# Patient Record
Sex: FEMALE | Race: WHITE | NOT HISPANIC OR LATINO | Employment: UNEMPLOYED | URBAN - METROPOLITAN AREA
[De-identification: names, ages, dates, MRNs, and addresses within clinical notes are randomized per-mention and may not be internally consistent; named-entity substitution may affect disease eponyms.]

---

## 2017-02-24 ENCOUNTER — HOSPITAL ENCOUNTER (EMERGENCY)
Facility: MEDICAL CENTER | Age: 27
End: 2017-02-24
Attending: EMERGENCY MEDICINE
Payer: MEDICAID

## 2017-02-24 VITALS
HEART RATE: 90 BPM | TEMPERATURE: 97.6 F | SYSTOLIC BLOOD PRESSURE: 118 MMHG | HEIGHT: 65 IN | WEIGHT: 89.51 LBS | DIASTOLIC BLOOD PRESSURE: 74 MMHG | RESPIRATION RATE: 18 BRPM | OXYGEN SATURATION: 99 % | BODY MASS INDEX: 14.91 KG/M2

## 2017-02-24 DIAGNOSIS — J06.9 VIRAL UPPER RESPIRATORY ILLNESS: ICD-10-CM

## 2017-02-24 PROCEDURE — 99283 EMERGENCY DEPT VISIT LOW MDM: CPT

## 2017-02-24 ASSESSMENT — PAIN SCALES - GENERAL: PAINLEVEL_OUTOF10: 7

## 2017-02-24 NOTE — ED NOTES
Discharged in good condition with follow up instructions, pt verbalizes understanding of all, ambulates out with steady gait accompanied by family.

## 2017-02-24 NOTE — ED AVS SNAPSHOT
Home Care Instructions                                                                                                                Radha Cerrato   MRN: [id number]    Department:  [facility], Emergency Dept   Date of Visit:  2/24/2017            [facility], Emergency Dept    [address]    [place] [address]    Phone:  [phone]      You were seen by     Jl Day M.D.      Your Diagnosis Was     Viral upper respiratory illness     J06.9, B97.89       Medication Information     Review all of your home medications and newly ordered medications with your primary doctor and/or pharmacist as soon as possible. Follow medication instructions as directed by your doctor and/or pharmacist.     Please keep your complete medication list with you and share with your physician. Update the information when medications are discontinued, doses are changed, or new medications (including over-the-counter products) are added; and carry medication information at all times in the event of emergency situations.               Medication List      ASK your doctor about these medications        Instructions    azithromycin 250 MG Tabs   Commonly known as:  ZITHROMAX    Please dispense a z-hannah and use a directed       hydrocodone-acetaminophen 5-325 MG Tabs per tablet   Commonly known as:  NORCO    Take 1-2 Tabs by mouth every four hours as needed.   Dose:  1-2 Tab       naproxen 500 MG Tabs   Commonly known as:  NAPROSYN    Take 1 Tab by mouth 2 times a day, with meals.   Dose:  500 mg       ondansetron 4 MG Tbdp   Commonly known as:  ZOFRAN ODT    Take 1 Tab by mouth every 8 hours as needed for Nausea/Vomiting.   Dose:  4 mg                 Discharge Instructions       Antibiotic Nonuse   Your caregiver felt that the infection or problem was not one that would be helped with an antibiotic.  Infections may be caused by viruses or bacteria. Only a caregiver can tell  "which one of these is the likely cause of an illness. A cold is the most common cause of infection in both adults and children. A cold is a virus. Antibiotic treatment will have no effect on a viral infection. Viruses can lead to many lost days of work caring for sick children and many missed days of school. Children may catch as many as 10 \"colds\" or \"flus\" per year during which they can be tearful, cranky, and uncomfortable. The goal of treating a virus is aimed at keeping the ill person comfortable.  Antibiotics are medications used to help the body fight bacterial infections. There are relatively few types of bacteria that cause infections but there are hundreds of viruses. While both viruses and bacteria cause infection they are very different types of germs. A viral infection will typically go away by itself within 7 to 10 days. Bacterial infections may spread or get worse without antibiotic treatment.  Examples of bacterial infections are:  · Sore throats (like strep throat or tonsillitis).  · Infection in the lung (pneumonia).  · Ear and skin infections.  Examples of viral infections are:  · Colds or flus.  · Most coughs and bronchitis.  · Sore throats not caused by Strep.  · Runny noses.  It is often best not to take an antibiotic when a viral infection is the cause of the problem. Antibiotics can kill off the helpful bacteria that we have inside our body and allow harmful bacteria to start growing. Antibiotics can cause side effects such as allergies, nausea, and diarrhea without helping to improve the symptoms of the viral infection. Additionally, repeated uses of antibiotics can cause bacteria inside of our body to become resistant. That resistance can be passed onto harmful bacterial. The next time you have an infection it may be harder to treat if antibiotics are used when they are not needed. Not treating with antibiotics allows our own immune system to develop and take care of infections more " efficiently. Also, antibiotics will work better for us when they are prescribed for bacterial infections.  Treatments for a child that is ill may include:  · Give extra fluids throughout the day to stay hydrated.  · Get plenty of rest.  · Only give your child over-the-counter or prescription medicines for pain, discomfort, or fever as directed by your caregiver.  · The use of a cool mist humidifier may help stuffy noses.  · Cold medications if suggested by your caregiver.  Your caregiver may decide to start you on an antibiotic if:  · The problem you were seen for today continues for a longer length of time than expected.  · You develop a secondary bacterial infection.  SEEK MEDICAL CARE IF:  · Fever lasts longer than 5 days.  · Symptoms continue to get worse after 5 to 7 days or become severe.  · Difficulty in breathing develops.  · Signs of dehydration develop (poor drinking, rare urinating, dark colored urine).  · Changes in behavior or worsening tiredness (listlessness or lethargy).  Document Released: 02/26/2003 Document Revised: 03/11/2013 Document Reviewed: 08/25/2010  ExitCare® Patient Information ©2014 [employer].    Cool Mist Vaporizers  Vaporizers may help relieve the symptoms of a cough and cold. They add moisture to the air, which helps mucus to become thinner and less sticky. This makes it easier to breathe and cough up secretions. Cool mist vaporizers do not cause serious burns like hot mist vaporizers, which may also be called steamers or humidifiers. Vaporizers have not been proven to help with colds. You should not use a vaporizer if you are allergic to mold.  HOME CARE INSTRUCTIONS  · Follow the package instructions for the vaporizer.  · Do not use anything other than distilled water in the vaporizer.  · Do not run the vaporizer all of the time. This can cause mold or bacteria to grow in the vaporizer.  · Clean the vaporizer after each time it is used.  · Clean and dry the vaporizer well before  "storing it.  · Stop using the vaporizer if worsening respiratory symptoms develop.     This information is not intended to replace advice given to you by your health care provider. Make sure you discuss any questions you have with your health care provider.     Document Released: 09/14/2005 Document Revised: 12/23/2014 Document Reviewed: 05/07/2014  [employer] Interactive Patient Education ©2016 [employer]    Viral Infections  A viral infection can be caused by different types of viruses. Most viral infections are not serious and resolve on their own. However, some infections may cause severe symptoms and may lead to further complications.  SYMPTOMS  Viruses can frequently cause:  · Minor sore throat.  · Aches and pains.  · Headaches.  · Runny nose.  · Different types of rashes.  · Watery eyes.  · Tiredness.  · Cough.  · Loss of appetite.  · Gastrointestinal infections, resulting in nausea, vomiting, and diarrhea.  These symptoms do not respond to antibiotics because the infection is not caused by bacteria. However, you might catch a bacterial infection following the viral infection. This is sometimes called a \"superinfection.\" Symptoms of such a bacterial infection may include:  · Worsening sore throat with pus and difficulty swallowing.  · Swollen neck glands.  · Chills and a high or persistent fever.  · Severe headache.  · Tenderness over the sinuses.  · Persistent overall ill feeling (malaise), muscle aches, and tiredness (fatigue).  · Persistent cough.  · Yellow, green, or brown mucus production with coughing.  HOME CARE INSTRUCTIONS   · Only take over-the-counter or prescription medicines for pain, discomfort, diarrhea, or fever as directed by your caregiver.  · Drink enough water and fluids to keep your urine clear or pale yellow. Sports drinks can provide valuable electrolytes, sugars, and hydration.  · Get plenty of rest and maintain proper nutrition. Soups and broths with crackers or rice are fine.  SEEK " IMMEDIATE MEDICAL CARE IF:   · You have severe headaches, shortness of breath, chest pain, neck pain, or an unusual rash.  · You have uncontrolled vomiting, diarrhea, or you are unable to keep down fluids.  · You or your child has an oral temperature above 102° F (38.9° C), not controlled by medicine.  · Your baby is older than 3 months with a rectal temperature of 102° F (38.9° C) or higher.  · Your baby is 3 months old or younger with a rectal temperature of 100.4° F (38° C) or higher.  MAKE SURE YOU:   · Understand these instructions.  · Will watch your condition.  · Will get help right away if you are not doing well or get worse.     This information is not intended to replace advice given to you by your health care provider. Make sure you discuss any questions you have with your health care provider.     Document Released: 09/27/2006 Document Revised: 03/11/2013 Document Reviewed: 04/23/2012  [employer] Interactive Patient Education ©2016 [employer].            Patient Information     Patient Information    Following emergency treatment: all patient requiring follow-up care must return either to a private physician or a clinic if your condition worsens before you are able to obtain further medical attention, please return to the emergency room.     Billing Information    At [facility], we work to make the billing process streamlined for our patients.  Our Representatives are here to answer any questions you may have regarding your hospital bill.  If you have insurance coverage and have supplied your insurance information to us, we will submit a claim to your insurer on your behalf.  Should you have any questions regarding your bill, we can be reached online or by phone as follows:  Online: You are able pay your bills online or live chat with our representatives about any billing questions you may have. We are here to help Monday - Friday from 8:00am to 7:30pm and 9:00am - 12:00pm on Saturdays.  Please visit  [email or web address]  for more information.   Phone:  [phone] or [phone]    Please note that your emergency physician, surgeon, pathologist, radiologist, anesthesiologist, and other specialists are not employed by [facility] and will therefore bill separately for their services.  Please contact them directly for any questions concerning their bills at the numbers below:     Emergency Physician Services:  [phone]  [facility]:  [phone]  [facility]:  [phone]  [facility]:  [phone]    1. Your final bill may vary from the amount quoted upon discharge if all procedures are not complete at that time, or if your doctor has additional procedures of which we are not aware. You will receive an additional bill if you return to the Emergency Department at [facility] for suture removal regardless of the facility of which the sutures were placed.     2. Please arrange for settlement of this account at the emergency registration.    3. All self-pay accounts are due in full at the time of treatment.  If you are unable to meet this obligation then payment is expected within 4-5 days.     4. If you have had radiology studies (CT, X-ray, Ultrasound, MRI), you have received a preliminary result during your emergency department visit. Please contact the radiology department [phone] to receive a copy of your final result. Please discuss the Final result with your primary physician or with the follow up physician provided.     Crisis Hotline:  [place] Crisis Hotline:  7-975-BJBLUYA or 1-735.918.2287  [place] Crisis Hotline:    1-647.437.4531 or 597-312-9407         ED Discharge Follow Up Questions    1. In order to provide you with very good care, we would like to follow up with a phone call in the next few days.  May we have your permission to contact you?     YES /  NO    2. What is the best phone number to call  you? (       )_____-__________    3. What is the best time to call you?      Morning  /  Afternoon  /  Evening                   Patient Signature:  ____________________________________________________________    Date:  ____________________________________________________________

## 2017-02-24 NOTE — ED AVS SNAPSHOT
Kuldat Access Code: [email or web address]  Expires: 3/26/2017  2:42 PM    Kuldat  A secure, online tool to manage your health information     [employer]’s Kuldat® is a secure, online tool that connects you to your personalized health information from the privacy of your home -- day or night - making it very easy for you to manage your healthcare. Once the activation process is completed, you can even access your medical information using the Kuldat iwona, which is available for free in the Apple Iwona store or Google Play store.     Kuldat provides the following levels of access (as shown below):   My Chart Features   [facility] Primary Care Doctor [facility]  Specialists [facility]  Urgent  Care Non-[facility]  Primary Care  Doctor   Email your healthcare team securely and privately 24/7 X X X X   Manage appointments: schedule your next appointment; view details of past/upcoming appointments X      Request prescription refills. X      View recent personal medical records, including lab and immunizations X X X X   View health record, including health history, allergies, medications X X X X   Read reports about your outpatient visits, procedures, consult and ER notes X X X X   See your discharge summary, which is a recap of your hospital and/or ER visit that includes your diagnosis, lab results, and care plan. X X       How to register for Kuldat:  1. Go to  [email or web address].  2. Click on the Sign Up Now box, which takes you to the New Member Sign Up page. You will need to provide the following information:  a. Enter your Kuldat Access Code exactly as it appears at the top of this page. (You will not need to use this code after you’ve completed the sign-up process. If you do not sign up before the expiration date, you must request a new code.)   b. Enter your date of birth.   c. Enter your home email address.   d. Click Submit, and follow the next screen’s instructions.  3. Create a Kuldat ID. This will be your Kuldat  login ID and cannot be changed, so think of one that is secure and easy to remember.  4. Create a [employer] password. You can change your password at any time.  5. Enter your Password Reset Question and Answer. This can be used at a later time if you forget your password.   6. Enter your e-mail address. This allows you to receive e-mail notifications when new information is available in [employer] BATTERY.  7. Click Sign Up. You can now view your health information.    For assistance activating your [employer] BATTERY account, call [phone]

## 2017-02-24 NOTE — ED PROVIDER NOTES
"ED Provider Note    CHIEF COMPLAINT  Chief Complaint   Patient presents with   • Sore Throat       HPI  Radha Cerrato is a 26 y.o. female who presents with a report of sore throat, cough, congestion and nasal discharge since this past Monday. She says that she got this infection from her . He was only sick 3 days. She says she also has bilateral ear pain. Denies any significant fever or other complaints    REVIEW OF SYSTEMS  See HPI for further details. All other systems are negative.     PAST MEDICAL HISTORY  Past Medical History   Diagnosis Date   • Ectopic pregnancy        FAMILY HISTORY  History reviewed. No pertinent family history.    SOCIAL HISTORY   reports that she has been smoking Cigarettes.  She has been smoking about 0.50 packs per day. She does not have any smokeless tobacco history on file. She reports that she does not drink alcohol or use illicit drugs.    SURGICAL HISTORY  History reviewed. No pertinent past surgical history.    CURRENT MEDICATIONS  Home Medications     **Home medications have not yet been reviewed for this encounter**          ALLERGIES  No Known Allergies    PHYSICAL EXAM  VITAL SIGNS: /74 mmHg  Pulse 90  Temp(Src) 36.4 °C (97.6 °F)  Resp 18  Ht 1.651 m (5' 5\")  Wt 40.6 kg (89 lb 8.1 oz)  BMI 14.89 kg/m2  SpO2 99%  LMP 01/26/2017   Constitutional: Well developed, Well nourished, No acute distress, Non-toxic appearance.   HENT: Normocephalic, Atraumatic, Bilateral external ears normal, bilateral tympanic membranes are only mildly erythematous , Oropharynx is mildly erythematous with small punctate exudate left side, no evidence of tonsillar swelling with tonsils are grade 1  Eyes: Pupils are equal round and reactive, EOMI, Conjunctiva normal, No discharge.   Neck: Normal range of motion, No tenderness, Supple, No stridor. No meningismus.  Lymphatic: No lymphadenopathy noted.   Cardiovascular: Regular rate and rhythm without murmur rub or " gallop.  Thorax & Lungs: Clear breath sounds bilaterally without wheezes, rhonchi or rales. There is no chest wall tenderness.   Abdomen: Soft non-tender non-distended. There is no rebound or guarding. No organomegaly is appreciated. Bowel sounds are normal.  Skin: Normal without rash.   Back: No CVA or spinal tenderness.   Extremities: Intact distal pulses, No edema, No tenderness, No cyanosis, No clubbing. Capillary refill is less than 2 seconds.  Musculoskeletal: Good range of motion in all major joints. No tenderness to palpation or major deformities noted.   Neurologic: Alert & oriented x 3, Normal motor function, Normal sensory function, No focal deficits noted. Reflexes are normal.  Psychiatric: Affect normal, Judgment normal, Mood normal. There is no suicidal ideation or patient reported hallucinations.       COURSE & MEDICAL DECISION MAKING  Pertinent Labs & Imaging studies reviewed. (See chart for details)  Given the patient's symptomatology, the likelihood of a viral illness is high. I very much doubt strep throat. The patient understand that the immune system is built to clear this type of infection. Patient understand that antibiotics will not change the course of this type of infection and that the patient's immune system is well suited to find this type of infection. The mainstay of therapy for viral infections is copious fluids, rest, fever control and frequent hand washing to avoid spread of the illness. Cool mist humidifier in the patient's bedroom will keep his mucous membranes healthy.    She is given instructions on symptomatic control. She will return if any significant change in symptoms    FINAL IMPRESSION  1. Viral upper respiratory illness             Electronically signed by: Jl Day, 2/24/2017 2:40 PM

## 2017-02-24 NOTE — DISCHARGE INSTRUCTIONS
"Antibiotic Nonuse   Your caregiver felt that the infection or problem was not one that would be helped with an antibiotic.  Infections may be caused by viruses or bacteria. Only a caregiver can tell which one of these is the likely cause of an illness. A cold is the most common cause of infection in both adults and children. A cold is a virus. Antibiotic treatment will have no effect on a viral infection. Viruses can lead to many lost days of work caring for sick children and many missed days of school. Children may catch as many as 10 \"colds\" or \"flus\" per year during which they can be tearful, cranky, and uncomfortable. The goal of treating a virus is aimed at keeping the ill person comfortable.  Antibiotics are medications used to help the body fight bacterial infections. There are relatively few types of bacteria that cause infections but there are hundreds of viruses. While both viruses and bacteria cause infection they are very different types of germs. A viral infection will typically go away by itself within 7 to 10 days. Bacterial infections may spread or get worse without antibiotic treatment.  Examples of bacterial infections are:  · Sore throats (like strep throat or tonsillitis).  · Infection in the lung (pneumonia).  · Ear and skin infections.  Examples of viral infections are:  · Colds or flus.  · Most coughs and bronchitis.  · Sore throats not caused by Strep.  · Runny noses.  It is often best not to take an antibiotic when a viral infection is the cause of the problem. Antibiotics can kill off the helpful bacteria that we have inside our body and allow harmful bacteria to start growing. Antibiotics can cause side effects such as allergies, nausea, and diarrhea without helping to improve the symptoms of the viral infection. Additionally, repeated uses of antibiotics can cause bacteria inside of our body to become resistant. That resistance can be passed onto harmful bacterial. The next time you have " an infection it may be harder to treat if antibiotics are used when they are not needed. Not treating with antibiotics allows our own immune system to develop and take care of infections more efficiently. Also, antibiotics will work better for us when they are prescribed for bacterial infections.  Treatments for a child that is ill may include:  · Give extra fluids throughout the day to stay hydrated.  · Get plenty of rest.  · Only give your child over-the-counter or prescription medicines for pain, discomfort, or fever as directed by your caregiver.  · The use of a cool mist humidifier may help stuffy noses.  · Cold medications if suggested by your caregiver.  Your caregiver may decide to start you on an antibiotic if:  · The problem you were seen for today continues for a longer length of time than expected.  · You develop a secondary bacterial infection.  SEEK MEDICAL CARE IF:  · Fever lasts longer than 5 days.  · Symptoms continue to get worse after 5 to 7 days or become severe.  · Difficulty in breathing develops.  · Signs of dehydration develop (poor drinking, rare urinating, dark colored urine).  · Changes in behavior or worsening tiredness (listlessness or lethargy).  Document Released: 02/26/2003 Document Revised: 03/11/2013 Document Reviewed: 08/25/2010  [employer]Care® Patient Information ©2014 [employer].    Cool Mist Vaporizers  Vaporizers may help relieve the symptoms of a cough and cold. They add moisture to the air, which helps mucus to become thinner and less sticky. This makes it easier to breathe and cough up secretions. Cool mist vaporizers do not cause serious burns like hot mist vaporizers, which may also be called steamers or humidifiers. Vaporizers have not been proven to help with colds. You should not use a vaporizer if you are allergic to mold.  HOME CARE INSTRUCTIONS  · Follow the package instructions for the vaporizer.  · Do not use anything other than distilled water in the  "vaporizer.  · Do not run the vaporizer all of the time. This can cause mold or bacteria to grow in the vaporizer.  · Clean the vaporizer after each time it is used.  · Clean and dry the vaporizer well before storing it.  · Stop using the vaporizer if worsening respiratory symptoms develop.     This information is not intended to replace advice given to you by your health care provider. Make sure you discuss any questions you have with your health care provider.     Document Released: 09/14/2005 Document Revised: 12/23/2014 Document Reviewed: 05/07/2014  [employer] Interactive Patient Education ©2016 [employer]    Viral Infections  A viral infection can be caused by different types of viruses. Most viral infections are not serious and resolve on their own. However, some infections may cause severe symptoms and may lead to further complications.  SYMPTOMS  Viruses can frequently cause:  · Minor sore throat.  · Aches and pains.  · Headaches.  · Runny nose.  · Different types of rashes.  · Watery eyes.  · Tiredness.  · Cough.  · Loss of appetite.  · Gastrointestinal infections, resulting in nausea, vomiting, and diarrhea.  These symptoms do not respond to antibiotics because the infection is not caused by bacteria. However, you might catch a bacterial infection following the viral infection. This is sometimes called a \"superinfection.\" Symptoms of such a bacterial infection may include:  · Worsening sore throat with pus and difficulty swallowing.  · Swollen neck glands.  · Chills and a high or persistent fever.  · Severe headache.  · Tenderness over the sinuses.  · Persistent overall ill feeling (malaise), muscle aches, and tiredness (fatigue).  · Persistent cough.  · Yellow, green, or brown mucus production with coughing.  HOME CARE INSTRUCTIONS   · Only take over-the-counter or prescription medicines for pain, discomfort, diarrhea, or fever as directed by your caregiver.  · Drink enough water and fluids to keep your " urine clear or pale yellow. Sports drinks can provide valuable electrolytes, sugars, and hydration.  · Get plenty of rest and maintain proper nutrition. Soups and broths with crackers or rice are fine.  SEEK IMMEDIATE MEDICAL CARE IF:   · You have severe headaches, shortness of breath, chest pain, neck pain, or an unusual rash.  · You have uncontrolled vomiting, diarrhea, or you are unable to keep down fluids.  · You or your child has an oral temperature above 102° F (38.9° C), not controlled by medicine.  · Your baby is older than 3 months with a rectal temperature of 102° F (38.9° C) or higher.  · Your baby is 3 months old or younger with a rectal temperature of 100.4° F (38° C) or higher.  MAKE SURE YOU:   · Understand these instructions.  · Will watch your condition.  · Will get help right away if you are not doing well or get worse.     This information is not intended to replace advice given to you by your health care provider. Make sure you discuss any questions you have with your health care provider.     Document Released: 09/27/2006 Document Revised: 03/11/2013 Document Reviewed: 04/23/2012  [employer] Interactive Patient Education ©2016 [employer]

## 2017-02-24 NOTE — ED AVS SNAPSHOT
2/24/2017          Radha Cerrato  [address]  [address]  [place] [address]    Dear Radha:    [facility] wants to ensure your discharge home is safe and you or your loved ones have had all your questions answered regarding your care after you leave the hospital.    You may receive a telephone call within two days of your discharge.  This call is to make certain you understand your discharge instructions as well as ensure we provided you with the best care possible during your stay with us.     The call will only last approximately 3-5 minutes and will be done by a nurse.    Once again, we want to ensure your discharge home is safe and that you have a clear understanding of any next steps in your care.  If you have any questions or concerns, please do not hesitate to contact us, we are here for you.  Thank you for choosing [facility] for your healthcare needs.    Sincerely,    Fernandez Do    [facility]

## 2018-11-27 ENCOUNTER — HOSPITAL ENCOUNTER (EMERGENCY)
Facility: MEDICAL CENTER | Age: 28
End: 2018-11-27
Attending: EMERGENCY MEDICINE
Payer: MEDICAID

## 2018-11-27 VITALS
OXYGEN SATURATION: 97 % | HEART RATE: 63 BPM | BODY MASS INDEX: 15.83 KG/M2 | SYSTOLIC BLOOD PRESSURE: 110 MMHG | TEMPERATURE: 97.7 F | RESPIRATION RATE: 17 BRPM | DIASTOLIC BLOOD PRESSURE: 84 MMHG | WEIGHT: 95 LBS | HEIGHT: 65 IN

## 2018-11-27 DIAGNOSIS — E86.0 DEHYDRATION: ICD-10-CM

## 2018-11-27 DIAGNOSIS — R11.2 NON-INTRACTABLE VOMITING WITH NAUSEA, UNSPECIFIED VOMITING TYPE: ICD-10-CM

## 2018-11-27 LAB
ALBUMIN SERPL BCP-MCNC: 3.6 G/DL (ref 3.2–4.9)
ALBUMIN/GLOB SERPL: 1.4 G/DL
ALP SERPL-CCNC: 40 U/L (ref 30–99)
ALT SERPL-CCNC: 17 U/L (ref 2–50)
ANION GAP SERPL CALC-SCNC: 11 MMOL/L (ref 0–11.9)
APPEARANCE UR: ABNORMAL
AST SERPL-CCNC: 14 U/L (ref 12–45)
BACTERIA #/AREA URNS HPF: ABNORMAL /HPF
BASOPHILS # BLD AUTO: 0.3 % (ref 0–1.8)
BASOPHILS # BLD: 0.05 K/UL (ref 0–0.12)
BILIRUB SERPL-MCNC: 0.5 MG/DL (ref 0.1–1.5)
BILIRUB UR QL STRIP.AUTO: NEGATIVE
BUN SERPL-MCNC: 12 MG/DL (ref 8–22)
CALCIUM SERPL-MCNC: 8.5 MG/DL (ref 8.5–10.5)
CHLORIDE SERPL-SCNC: 108 MMOL/L (ref 96–112)
CO2 SERPL-SCNC: 19 MMOL/L (ref 20–33)
COLOR UR: YELLOW
CREAT SERPL-MCNC: 0.67 MG/DL (ref 0.5–1.4)
EOSINOPHIL # BLD AUTO: 0.01 K/UL (ref 0–0.51)
EOSINOPHIL NFR BLD: 0.1 % (ref 0–6.9)
EPI CELLS #/AREA URNS HPF: ABNORMAL /HPF
ERYTHROCYTE [DISTWIDTH] IN BLOOD BY AUTOMATED COUNT: 43.9 FL (ref 35.9–50)
GLOBULIN SER CALC-MCNC: 2.6 G/DL (ref 1.9–3.5)
GLUCOSE SERPL-MCNC: 91 MG/DL (ref 65–99)
GLUCOSE UR STRIP.AUTO-MCNC: NEGATIVE MG/DL
HCT VFR BLD AUTO: 36.2 % (ref 37–47)
HGB BLD-MCNC: 12.5 G/DL (ref 12–16)
HYALINE CASTS #/AREA URNS LPF: ABNORMAL /LPF
IMM GRANULOCYTES # BLD AUTO: 0.08 K/UL (ref 0–0.11)
IMM GRANULOCYTES NFR BLD AUTO: 0.5 % (ref 0–0.9)
KETONES UR STRIP.AUTO-MCNC: ABNORMAL MG/DL
LEUKOCYTE ESTERASE UR QL STRIP.AUTO: ABNORMAL
LYMPHOCYTES # BLD AUTO: 2.99 K/UL (ref 1–4.8)
LYMPHOCYTES NFR BLD: 18.3 % (ref 22–41)
MCH RBC QN AUTO: 31.8 PG (ref 27–33)
MCHC RBC AUTO-ENTMCNC: 34.5 G/DL (ref 33.6–35)
MCV RBC AUTO: 92.1 FL (ref 81.4–97.8)
MICRO URNS: ABNORMAL
MONOCYTES # BLD AUTO: 1.26 K/UL (ref 0–0.85)
MONOCYTES NFR BLD AUTO: 7.7 % (ref 0–13.4)
NEUTROPHILS # BLD AUTO: 11.95 K/UL (ref 2–7.15)
NEUTROPHILS NFR BLD: 73.1 % (ref 44–72)
NITRITE UR QL STRIP.AUTO: NEGATIVE
NRBC # BLD AUTO: 0 K/UL
NRBC BLD-RTO: 0 /100 WBC
PH UR STRIP.AUTO: >=9 [PH]
PLATELET # BLD AUTO: 485 K/UL (ref 164–446)
PMV BLD AUTO: 9 FL (ref 9–12.9)
POTASSIUM SERPL-SCNC: 3.4 MMOL/L (ref 3.6–5.5)
PROT SERPL-MCNC: 6.2 G/DL (ref 6–8.2)
PROT UR QL STRIP: 30 MG/DL
RBC # BLD AUTO: 3.93 M/UL (ref 4.2–5.4)
RBC # URNS HPF: ABNORMAL /HPF
RBC UR QL AUTO: NEGATIVE
SODIUM SERPL-SCNC: 138 MMOL/L (ref 135–145)
SP GR UR STRIP.AUTO: 1.02
UROBILINOGEN UR STRIP.AUTO-MCNC: 0.2 MG/DL
WBC # BLD AUTO: 16.3 K/UL (ref 4.8–10.8)
WBC #/AREA URNS HPF: ABNORMAL /HPF

## 2018-11-27 PROCEDURE — 81001 URINALYSIS AUTO W/SCOPE: CPT

## 2018-11-27 PROCEDURE — A9270 NON-COVERED ITEM OR SERVICE: HCPCS | Performed by: EMERGENCY MEDICINE

## 2018-11-27 PROCEDURE — 99284 EMERGENCY DEPT VISIT MOD MDM: CPT

## 2018-11-27 PROCEDURE — 80053 COMPREHEN METABOLIC PANEL: CPT

## 2018-11-27 PROCEDURE — 700102 HCHG RX REV CODE 250 W/ 637 OVERRIDE(OP): Performed by: EMERGENCY MEDICINE

## 2018-11-27 PROCEDURE — 85025 COMPLETE CBC W/AUTO DIFF WBC: CPT

## 2018-11-27 RX ORDER — ACETAMINOPHEN 325 MG/1
650 TABLET ORAL ONCE
Status: COMPLETED | OUTPATIENT
Start: 2018-11-27 | End: 2018-11-27

## 2018-11-27 RX ORDER — IBUPROFEN 600 MG/1
600 TABLET ORAL ONCE
Status: COMPLETED | OUTPATIENT
Start: 2018-11-27 | End: 2018-11-27

## 2018-11-27 RX ORDER — ONDANSETRON 4 MG/1
4 TABLET, FILM COATED ORAL EVERY 4 HOURS PRN
Qty: 20 TAB | Refills: 0 | Status: SHIPPED | OUTPATIENT
Start: 2018-11-27 | End: 2019-09-20

## 2018-11-27 RX ADMIN — ACETAMINOPHEN 650 MG: 325 TABLET, FILM COATED ORAL at 21:21

## 2018-11-27 RX ADMIN — IBUPROFEN 600 MG: 600 TABLET, FILM COATED ORAL at 21:21

## 2018-11-28 NOTE — ED NOTES
Pt discharged home. Assessment complete. Pt ambulates self. VS stable. Pt verbalized understanding discharge instructions. Prescriptions given pt verbalizes teaching provided. Pt being driven home by family.

## 2018-11-28 NOTE — ED TRIAGE NOTES
Radha Cerrato  28 y.o. female  Chief Complaint   Patient presents with   • N/V     Pt reports she went out drinking yesterday evening, got very intoxicated and has been actively vomiting all day. She reports nausea increases with movement. 1L NS, 8mg zofran, 100mg thiamine PTA with minimal relief.    • Headache      Pt reports she also fell out of her bed last night and hit her head, she states she's had a headache since. -Bloodthinners. No obvious injury. GCS 15.      Pt is alert and oriented, speaking in full sentences, follows commands and responds appropriately to questions. NAD. Resp are even and unlabored.

## 2018-11-28 NOTE — ED PROVIDER NOTES
ED Provider Note    Scribed for Lynne Lowe M.D. by Federico Pierre. 11/27/2018, 8:59 PM.    Primary care provider: Pcp Pt States None  Means of arrival: Ambulance  History obtained from: Patient  History limited by: None    CHIEF COMPLAINT  Chief Complaint   Patient presents with   • N/V     Pt reports she went out drinking yesterday evening, got very intoxicated and has been actively vomiting all day. She reports nausea increases with movement. 1L NS, 8mg zofran, 100mg thiamine PTA with minimal relief.    • Headache      Pt reports she also fell out of her bed last night and hit her head, she states she's had a headache since. -Bloodthinners. No obvious injury. GCS 15.        HPI  Radha Cerrato is a 28 y.o. female who presents to the Emergency Department for evaluation of headache onset last night. The patient became very intoxicated last night and does not remember walking home. She believes her fiance helped her home. The patient confirms multiple falls onto concrete floors last night. She believes that she may have hit her head. She also reports right hip pain after falling on it last night. The patient reports that she has been nauseated since last night and began having multiple episodes of emesis onset 20 hours ago. She denies any fevers, shortness of breath, and vision changes. She denies any blood thinners.    REVIEW OF SYSTEMS  Pertinent positives include headache, nausea, emesis, and hip pain. Pertinent negatives include no fevers, shortness of breath and vision changes.  All other systems reviewed and negative.    PAST MEDICAL HISTORY   has a past medical history of Ectopic pregnancy.    SURGICAL HISTORY  patient denies any surgical history    SOCIAL HISTORY  Social History   Substance Use Topics   • Smoking status: Current Every Day Smoker     Packs/day: 0.50     Types: Cigarettes   • Smokeless tobacco: Never Used   • Alcohol use No      Comment: social      History   Drug Use      "Comment: marijuana       FAMILY HISTORY  None noted    CURRENT MEDICATIONS  No current facility-administered medications for this encounter.     Current Outpatient Prescriptions:   •  ondansetron (ZOFRAN) 4 MG Tab tablet, Take 1 Tab by mouth every four hours as needed for Nausea/Vomiting., Disp: 20 Tab, Rfl: 0  •  ondansetron (ZOFRAN ODT) 4 MG TABLET DISPERSIBLE, Take 1 Tab by mouth every 8 hours as needed for Nausea/Vomiting., Disp: 10 Tab, Rfl: 0  •  hydrocodone-acetaminophen (NORCO) 5-325 MG Tab per tablet, Take 1-2 Tabs by mouth every four hours as needed., Disp: 20 Tab, Rfl: 0  •  azithromycin (ZITHROMAX) 250 MG Tab, Please dispense a z-hannah and use a directed, Disp: 6 Tab, Rfl: 0  •  naproxen (NAPROSYN) 500 MG Tab, Take 1 Tab by mouth 2 times a day, with meals., Disp: 30 Tab, Rfl: 0      ALLERGIES  Allergies   Allergen Reactions   • Vicodin [Hydrocodone-Acetaminophen] Vomiting       PHYSICAL EXAM  VITAL SIGNS: /61   Pulse 61   Temp 36.5 °C (97.7 °F) (Temporal)   Resp 16   Ht 1.651 m (5' 5\")   Wt 43.1 kg (95 lb)   LMP  (Approximate) Comment: \"about three weeks ago\"  SpO2 98%   BMI 15.81 kg/m²   Constitutional: Alert in no apparent distress.  HENT: No signs of trauma, Bilateral external ears normal, Nose normal.   Eyes: Pupils are equal and reactive, Conjunctiva normal, Non-icteric.   Neck: Normal range of motion, No tenderness, Supple, No stridor.   Cardiovascular: Regular rate and rhythm, no murmurs.   Thorax & Lungs: Normal breath sounds, No respiratory distress, No wheezing, No chest tenderness.   Abdomen: Bowel sounds normal, Soft, No tenderness, No masses, No peritoneal signs.  Skin: Warm, Dry, No erythema, No rash. No bruising  Musculoskeletal:  No major deformities noted. Good range of motion in all major joints. Ambulated without difficulty.  Neurologic: Alert, moving all extremities without difficulty, no focal deficits.    LABS  Labs Reviewed   CBC WITH DIFFERENTIAL - Abnormal; Notable " for the following:        Result Value    WBC 16.3 (*)     RBC 3.93 (*)     Hematocrit 36.2 (*)     Platelet Count 485 (*)     Neutrophils-Polys 73.10 (*)     Lymphocytes 18.30 (*)     Neutrophils (Absolute) 11.95 (*)     Monos (Absolute) 1.26 (*)     All other components within normal limits   COMP METABOLIC PANEL - Abnormal; Notable for the following:     Potassium 3.4 (*)     Co2 19 (*)     All other components within normal limits   URINALYSIS,CULTURE IF INDICATED - Abnormal; Notable for the following:     Character Cloudy (*)     Ph >=9.0 (*)     Ketones Trace (*)     Protein 30 (*)     Leukocyte Esterase Trace (*)     All other components within normal limits   URINE MICROSCOPIC (W/UA) - Abnormal; Notable for the following:     WBC 5-10 (*)     RBC 2-5 (*)     Bacteria Moderate (*)     Epithelial Cells Many (*)     Hyaline Cast 6-10 (*)     All other components within normal limits   ESTIMATED GFR     All labs reviewed by me.    COURSE & MEDICAL DECISION MAKING  Pertinent Labs & Imaging studies reviewed. (See chart for details)    Differential diagnoses include but are not limited to: Dehydration    8:59 PM - Patient seen and examined at bedside. Patient will be treated with Tylenol 650 mg and Motrin 600 mg.  The patient has no risk factors for intracranial bleed and I do not think imaging is indicated at this time she is in no Char for and has no focal deficits. I will order labs for evaluation. The patient reports that she is able to tolerate fluids orally. I have advised that the patient stay hydrated and informed her that she will get better on her own.    9:58 PM I reevaluated the patient and she was resting in bed. She agrees to PO challenge. Should she tolerate intake, then she will be discharged home. Should the patient develop any new or worsening symptoms, she is advised to return. She is to follow up with her PCP.  Labs show slightly elevated white blood cell count.  Her urinalysis is contaminated  I do not think this is indicative of urinary tract infection.  Patient felt significantly better after prehospital interventions was able to tolerate p.o. and will be discharged.    10:05 PM Patient tolerated oral intake. She will be discharged home with a prescription for Zofran. The patient understands and agrees to discharge home.      The patient will return for new or worsening symptoms and is stable at the time of discharge.    DISPOSITION:  Patient will be discharged home in stable condition.    FOLLOW UP:  [facility], Emergency Dept  [address] [address] [phone]    Worsening vomiting, headache or other concerns      OUTPATIENT MEDICATIONS:  New Prescriptions    ONDANSETRON (ZOFRAN) 4 MG TAB TABLET    Take 1 Tab by mouth every four hours as needed for Nausea/Vomiting.       FINAL IMPRESSION  1. Dehydration    2. Non-intractable vomiting with nausea, unspecified vomiting type           This dictation has been created using voice recognition software and/or scribes. The accuracy of the dictation is limited by the abilities of the software and the expertise of the scribes. I expect there may be some errors of grammar and possibly content. I made every attempt to manually correct the errors within my dictation. However, errors related to voice recognition software and/or scribes may still exist and should be interpreted within the appropriate context.    I, Federico Pierre (Scribe), am scribing for, and in the presence of, Lynne Lowe M.D.     Electronically signed by: Federico Pierre (Scribe), 11/27/2018     I, Lynne Lowe M.D. personally performed the services described in this documentation, as scribed by Federico Pierre in my presence, and it is both accurate and complete. C    The note accurately reflects work and decisions made by me.  Lynne Lowe  11/27/2018  10:56 PM

## 2019-01-11 ENCOUNTER — HOSPITAL ENCOUNTER (EMERGENCY)
Facility: MEDICAL CENTER | Age: 29
End: 2019-01-11
Attending: EMERGENCY MEDICINE

## 2019-01-11 ENCOUNTER — APPOINTMENT (OUTPATIENT)
Dept: RADIOLOGY | Facility: MEDICAL CENTER | Age: 29
End: 2019-01-11
Attending: EMERGENCY MEDICINE

## 2019-01-11 VITALS
TEMPERATURE: 97 F | DIASTOLIC BLOOD PRESSURE: 60 MMHG | SYSTOLIC BLOOD PRESSURE: 120 MMHG | RESPIRATION RATE: 16 BRPM | HEIGHT: 66 IN | OXYGEN SATURATION: 98 % | HEART RATE: 78 BPM | BODY MASS INDEX: 14.63 KG/M2 | WEIGHT: 91 LBS

## 2019-01-11 DIAGNOSIS — N12 PYELONEPHRITIS: ICD-10-CM

## 2019-01-11 LAB
ANION GAP SERPL CALC-SCNC: 7 MMOL/L (ref 0–11.9)
APPEARANCE UR: CLEAR
APPEARANCE UR: CLEAR
BACTERIA #/AREA URNS HPF: ABNORMAL /HPF
BACTERIA #/AREA URNS HPF: ABNORMAL /HPF
BASOPHILS # BLD AUTO: 0.4 % (ref 0–1.8)
BASOPHILS # BLD: 0.06 K/UL (ref 0–0.12)
BILIRUB UR QL STRIP.AUTO: NEGATIVE
BILIRUB UR QL STRIP.AUTO: NEGATIVE
BUN SERPL-MCNC: 9 MG/DL (ref 8–22)
CALCIUM SERPL-MCNC: 9.5 MG/DL (ref 8.5–10.5)
CHLORIDE SERPL-SCNC: 110 MMOL/L (ref 96–112)
CO2 SERPL-SCNC: 22 MMOL/L (ref 20–33)
COLOR UR: YELLOW
COLOR UR: YELLOW
CREAT SERPL-MCNC: 0.69 MG/DL (ref 0.5–1.4)
EOSINOPHIL # BLD AUTO: 0.09 K/UL (ref 0–0.51)
EOSINOPHIL NFR BLD: 0.5 % (ref 0–6.9)
EPI CELLS #/AREA URNS HPF: ABNORMAL /HPF
EPI CELLS #/AREA URNS HPF: ABNORMAL /HPF
ERYTHROCYTE [DISTWIDTH] IN BLOOD BY AUTOMATED COUNT: 44.2 FL (ref 35.9–50)
GLUCOSE SERPL-MCNC: 95 MG/DL (ref 65–99)
GLUCOSE UR STRIP.AUTO-MCNC: NEGATIVE MG/DL
GLUCOSE UR STRIP.AUTO-MCNC: NEGATIVE MG/DL
HCG UR QL: NEGATIVE
HCT VFR BLD AUTO: 36.6 % (ref 37–47)
HGB BLD-MCNC: 12.3 G/DL (ref 12–16)
HYALINE CASTS #/AREA URNS LPF: ABNORMAL /LPF
HYALINE CASTS #/AREA URNS LPF: ABNORMAL /LPF
IMM GRANULOCYTES # BLD AUTO: 0.06 K/UL (ref 0–0.11)
IMM GRANULOCYTES NFR BLD AUTO: 0.4 % (ref 0–0.9)
KETONES UR STRIP.AUTO-MCNC: NEGATIVE MG/DL
KETONES UR STRIP.AUTO-MCNC: NEGATIVE MG/DL
LEUKOCYTE ESTERASE UR QL STRIP.AUTO: ABNORMAL
LEUKOCYTE ESTERASE UR QL STRIP.AUTO: ABNORMAL
LYMPHOCYTES # BLD AUTO: 3.67 K/UL (ref 1–4.8)
LYMPHOCYTES NFR BLD: 21.7 % (ref 22–41)
MCH RBC QN AUTO: 30.8 PG (ref 27–33)
MCHC RBC AUTO-ENTMCNC: 33.6 G/DL (ref 33.6–35)
MCV RBC AUTO: 91.5 FL (ref 81.4–97.8)
MICRO URNS: ABNORMAL
MICRO URNS: ABNORMAL
MONOCYTES # BLD AUTO: 0.93 K/UL (ref 0–0.85)
MONOCYTES NFR BLD AUTO: 5.5 % (ref 0–13.4)
NEUTROPHILS # BLD AUTO: 12.14 K/UL (ref 2–7.15)
NEUTROPHILS NFR BLD: 71.5 % (ref 44–72)
NITRITE UR QL STRIP.AUTO: NEGATIVE
NITRITE UR QL STRIP.AUTO: NEGATIVE
NRBC # BLD AUTO: 0 K/UL
NRBC BLD-RTO: 0 /100 WBC
PH UR STRIP.AUTO: 7 [PH]
PH UR STRIP.AUTO: 7 [PH]
PLATELET # BLD AUTO: 432 K/UL (ref 164–446)
PMV BLD AUTO: 9.1 FL (ref 9–12.9)
POTASSIUM SERPL-SCNC: 3.7 MMOL/L (ref 3.6–5.5)
PROT UR QL STRIP: NEGATIVE MG/DL
PROT UR QL STRIP: NEGATIVE MG/DL
RBC # BLD AUTO: 4 M/UL (ref 4.2–5.4)
RBC # URNS HPF: ABNORMAL /HPF
RBC # URNS HPF: ABNORMAL /HPF
RBC UR QL AUTO: NEGATIVE
RBC UR QL AUTO: NEGATIVE
SODIUM SERPL-SCNC: 139 MMOL/L (ref 135–145)
SP GR UR REFRACTOMETRY: 1.01
SP GR UR STRIP.AUTO: 1.01
SP GR UR STRIP.AUTO: 1.01
UROBILINOGEN UR STRIP.AUTO-MCNC: 0.2 MG/DL
UROBILINOGEN UR STRIP.AUTO-MCNC: 0.2 MG/DL
WBC # BLD AUTO: 17 K/UL (ref 4.8–10.8)
WBC #/AREA URNS HPF: ABNORMAL /HPF
WBC #/AREA URNS HPF: ABNORMAL /HPF

## 2019-01-11 PROCEDURE — 74176 CT ABD & PELVIS W/O CONTRAST: CPT

## 2019-01-11 PROCEDURE — 700111 HCHG RX REV CODE 636 W/ 250 OVERRIDE (IP): Performed by: EMERGENCY MEDICINE

## 2019-01-11 PROCEDURE — 700105 HCHG RX REV CODE 258: Performed by: EMERGENCY MEDICINE

## 2019-01-11 PROCEDURE — 96375 TX/PRO/DX INJ NEW DRUG ADDON: CPT

## 2019-01-11 PROCEDURE — 85025 COMPLETE CBC W/AUTO DIFF WBC: CPT

## 2019-01-11 PROCEDURE — 81001 URINALYSIS AUTO W/SCOPE: CPT

## 2019-01-11 PROCEDURE — 99285 EMERGENCY DEPT VISIT HI MDM: CPT

## 2019-01-11 PROCEDURE — 36415 COLL VENOUS BLD VENIPUNCTURE: CPT

## 2019-01-11 PROCEDURE — 80048 BASIC METABOLIC PNL TOTAL CA: CPT

## 2019-01-11 PROCEDURE — 96365 THER/PROPH/DIAG IV INF INIT: CPT

## 2019-01-11 PROCEDURE — 81025 URINE PREGNANCY TEST: CPT

## 2019-01-11 RX ORDER — ONDANSETRON 2 MG/ML
4 INJECTION INTRAMUSCULAR; INTRAVENOUS ONCE
Status: COMPLETED | OUTPATIENT
Start: 2019-01-11 | End: 2019-01-11

## 2019-01-11 RX ORDER — MORPHINE SULFATE 4 MG/ML
4 INJECTION, SOLUTION INTRAMUSCULAR; INTRAVENOUS ONCE
Status: COMPLETED | OUTPATIENT
Start: 2019-01-11 | End: 2019-01-11

## 2019-01-11 RX ORDER — SULFAMETHOXAZOLE AND TRIMETHOPRIM 800; 160 MG/1; MG/1
2 TABLET ORAL 2 TIMES DAILY
Qty: 40 TAB | Refills: 0 | Status: SHIPPED | OUTPATIENT
Start: 2019-01-11 | End: 2019-01-21

## 2019-01-11 RX ORDER — DIPHENHYDRAMINE HYDROCHLORIDE 50 MG/ML
25 INJECTION INTRAMUSCULAR; INTRAVENOUS ONCE
Status: COMPLETED | OUTPATIENT
Start: 2019-01-11 | End: 2019-01-11

## 2019-01-11 RX ORDER — SODIUM CHLORIDE 9 MG/ML
1000 INJECTION, SOLUTION INTRAVENOUS ONCE
Status: COMPLETED | OUTPATIENT
Start: 2019-01-11 | End: 2019-01-11

## 2019-01-11 RX ADMIN — MORPHINE SULFATE 4 MG: 4 INJECTION INTRAVENOUS at 02:34

## 2019-01-11 RX ADMIN — CEFTRIAXONE SODIUM 2 G: 2 INJECTION, POWDER, FOR SOLUTION INTRAMUSCULAR; INTRAVENOUS at 02:34

## 2019-01-11 RX ADMIN — SODIUM CHLORIDE 1000 ML: 9 INJECTION, SOLUTION INTRAVENOUS at 02:30

## 2019-01-11 RX ADMIN — ONDANSETRON 4 MG: 2 INJECTION INTRAMUSCULAR; INTRAVENOUS at 02:34

## 2019-01-11 RX ADMIN — DIPHENHYDRAMINE HYDROCHLORIDE 25 MG: 50 INJECTION INTRAMUSCULAR; INTRAVENOUS at 03:45

## 2019-01-11 ASSESSMENT — PAIN SCALES - GENERAL: PAINLEVEL_OUTOF10: 2

## 2019-01-11 NOTE — ED TRIAGE NOTES
"Radha Cerrato  28 y.o. female  Chief Complaint   Patient presents with   • Flank Pain     L sided pains, started lastnight. Pt has Hx of UTI/kidney infections.    • Painful Urination     started \"months\" ago.   /67   Pulse 79   Temp 36.1 °C (97 °F) (Temporal)   Resp 17   Ht 1.676 m (5' 6\")   Wt 97 kg (213 lb 13.5 oz)   LMP 01/07/2019   SpO2 97%   BMI 34.52 kg/m²   Pt amb to triage with steady gait for above complaint. Pt adding, SOB, with maybe lower abd pains. Pt states no relief with OTC pain meds.  Pt is alert and oriented, speaking in full sentences, follows commands and responds appropriately to questions. NAD. Resp are even and unlabored.  Pt placed in lobby. Pt educated on triage process. Pt encouraged to alert staff for any changes.    "

## 2019-01-11 NOTE — ED PROVIDER NOTES
"ED Provider Note    ED Provider Note      Primary care provider: Pcp Pt States None    CHIEF COMPLAINT  Chief Complaint   Patient presents with   • Flank Pain     L sided pains, started lastnight. Pt has Hx of UTI/kidney infections.    • Painful Urination     started \"months\" ago.       HPI  Radha Cerrato is a 28 y.o. female who presents to the Emergency Department with chief complaint of flank pain.  Patient reports history of chronic urinary tract infections and recurrent kidney stones she has had some dysuria urinary hesitancy and urgency she reports a stabbing pain in the left side of her flank that radiates down in the left pelvis.  She reports darkened urine she denies any vaginal discharge or bleeding.  No headache altered mental status cough congestion chest pain or shortness of breath.  She is noted no alleviating or aggravating factors to her pain..    REVIEW OF SYSTEMS  10 systems reviewed and otherwise negative, pertinent positives and negatives listed in the history of present illness.    PAST MEDICAL HISTORY   has a past medical history of Ectopic pregnancy and Renal disorder.    SURGICAL HISTORY  patient denies any surgical history    SOCIAL HISTORY  Social History   Substance Use Topics   • Smoking status: Current Every Day Smoker     Packs/day: 0.50     Types: Cigarettes   • Smokeless tobacco: Never Used   • Alcohol use No      Comment: social      History   Drug Use     Comment: marijuana       FAMILY HISTORY  Non-Contributory    CURRENT MEDICATIONS  Home Medications     Reviewed by Rich Jimenez R.N. (Registered Nurse) on 01/11/19 at 0129  Med List Status: Not Addressed   Medication Last Dose Status   azithromycin (ZITHROMAX) 250 MG Tab  Active   hydrocodone-acetaminophen (NORCO) 5-325 MG Tab per tablet  Active   naproxen (NAPROSYN) 500 MG Tab  Active   ondansetron (ZOFRAN ODT) 4 MG TABLET DISPERSIBLE  Active   ondansetron (ZOFRAN) 4 MG Tab tablet  Active          " "      ALLERGIES  Allergies   Allergen Reactions   • Vicodin [Hydrocodone-Acetaminophen] Vomiting       PHYSICAL EXAM  VITAL SIGNS: /67   Pulse 79   Temp 36.1 °C (97 °F) (Temporal)   Resp 17   Ht 1.676 m (5' 6\")   Wt 97 kg (213 lb 13.5 oz)   LMP 01/07/2019   SpO2 97%   BMI 34.52 kg/m²   Pulse ox interpretation: I interpret this pulse ox as normal.  Constitutional: Alert and oriented x 3, minimal distress  HEENT: Atraumatic normocephalic, pupils are equal round reactive to light extraocular movements are intact. The nares is clear, external ears are normal, mouth shows moist mucous membranes  Neck: Supple, no JVD no tracheal deviation  Cardiovascular: Regular rate and rhythm no murmur rub or gallop 2+ pulses peripherally x4  Thorax & Lungs: No respiratory distress, no wheezes rales or rhonchi, No chest tenderness.   GI: Tender in the left flank with radiation in the left pelvis no rebound no guarding positive bowel sounds nondistended  Skin: Warm dry no acute rash or lesion  Musculoskeletal: Moving all extremities with full range and 5 of 5 strength, no acute  deformity  Neurologic: Cranial nerves III through XII are grossly intact, no sensory deficit, no cerebellar dysfunction   Psychiatric: Appropriate affect for situation at this time      DIAGNOSTIC STUDIES / PROCEDURES  LABS      Results for orders placed or performed during the hospital encounter of 01/11/19   CBC WITH DIFFERENTIAL   Result Value Ref Range    WBC 17.0 (H) 4.8 - 10.8 K/uL    RBC 4.00 (L) 4.20 - 5.40 M/uL    Hemoglobin 12.3 12.0 - 16.0 g/dL    Hematocrit 36.6 (L) 37.0 - 47.0 %    MCV 91.5 81.4 - 97.8 fL    MCH 30.8 27.0 - 33.0 pg    MCHC 33.6 33.6 - 35.0 g/dL    RDW 44.2 35.9 - 50.0 fL    Platelet Count 432 164 - 446 K/uL    MPV 9.1 9.0 - 12.9 fL    Neutrophils-Polys 71.50 44.00 - 72.00 %    Lymphocytes 21.70 (L) 22.00 - 41.00 %    Monocytes 5.50 0.00 - 13.40 %    Eosinophils 0.50 0.00 - 6.90 %    Basophils 0.40 0.00 - 1.80 %    " Immature Granulocytes 0.40 0.00 - 0.90 %    Nucleated RBC 0.00 /100 WBC    Neutrophils (Absolute) 12.14 (H) 2.00 - 7.15 K/uL    Lymphs (Absolute) 3.67 1.00 - 4.80 K/uL    Monos (Absolute) 0.93 (H) 0.00 - 0.85 K/uL    Eos (Absolute) 0.09 0.00 - 0.51 K/uL    Baso (Absolute) 0.06 0.00 - 0.12 K/uL    Immature Granulocytes (abs) 0.06 0.00 - 0.11 K/uL    NRBC (Absolute) 0.00 K/uL   BASIC METABOLIC PANEL   Result Value Ref Range    Sodium 139 135 - 145 mmol/L    Potassium 3.7 3.6 - 5.5 mmol/L    Chloride 110 96 - 112 mmol/L    Co2 22 20 - 33 mmol/L    Glucose 95 65 - 99 mg/dL    Bun 9 8 - 22 mg/dL    Creatinine 0.69 0.50 - 1.40 mg/dL    Calcium 9.5 8.5 - 10.5 mg/dL    Anion Gap 7.0 0.0 - 11.9   URINALYSIS (UA)   Result Value Ref Range    Color Yellow     Character Clear     Specific Gravity 1.011 <1.035    Ph 7.0 5.0 - 8.0    Glucose Negative Negative mg/dL    Ketones Negative Negative mg/dL    Protein Negative Negative mg/dL    Bilirubin Negative Negative    Urobilinogen, Urine 0.2 Negative    Nitrite Negative Negative    Leukocyte Esterase Trace (A) Negative    Occult Blood Negative Negative    Micro Urine Req Microscopic    HCG QUALITATIVE UR (Lab)   Result Value Ref Range    Beta-Hcg Urine Negative Negative   REFRACTOMETER SG   Result Value Ref Range    Specific Gravity 1.011    URINE MICROSCOPIC (W/UA)   Result Value Ref Range    WBC 5-10 (A) /hpf    RBC 0-2 /hpf    Bacteria Moderate (A) None /hpf    Epithelial Cells Moderate (A) /hpf    Hyaline Cast 0-2 /lpf   URINALYSIS,CULTURE IF INDICATED   Result Value Ref Range    Color Yellow     Character Clear     Specific Gravity 1.006 <1.035    Ph 7.0 5.0 - 8.0    Glucose Negative Negative mg/dL    Ketones Negative Negative mg/dL    Protein Negative Negative mg/dL    Bilirubin Negative Negative    Urobilinogen, Urine 0.2 Negative    Nitrite Negative Negative    Leukocyte Esterase Small (A) Negative    Occult Blood Negative Negative    Micro Urine Req Microscopic     ESTIMATED GFR   Result Value Ref Range    GFR If African American >60 >60 mL/min/1.73 m 2    GFR If Non African American >60 >60 mL/min/1.73 m 2   URINE MICROSCOPIC (W/UA)   Result Value Ref Range    WBC 2-5 /hpf    RBC 0-2 /hpf    Bacteria Few (A) None /hpf    Epithelial Cells Few /hpf    Hyaline Cast 3-5 (A) /lpf       All labs reviewed by me.      RADIOLOGY  CT-RENAL COLIC EVALUATION(A/P W/O)   Final Result         1.  No acute abnormality.   2.  Left renal cyst. Calcification within or adjacent to the cyst is seen likely nephrolithiasis.   3.  Retroverted uterus   4.  Hepatomegaly   5.  Bilateral pulmonary nodules are seen measuring up to 3.8 mm, see nodule follow-up recommendations below.      Low Risk: No routine follow-up      High Risk: Optional CT at 12 months      Comments: Use most suspicious nodule as guide to management. Follow-up intervals may vary according to size and risk.      Low Risk - Minimal or absent history of smoking and of other known risk factors.      High Risk - History of smoking or of other known risk factors.      Note: These recommendations do not apply to lung cancer screening, patients with immunosuppression, or patients with known primary cancer.      Fleischner Society 2017 Guidelines for Management of Incidentally Detected Pulmonary Nodules in Adults           The radiologist's interpretation of all radiological studies have been reviewed by me.    COURSE & MEDICAL DECISION MAKING  Pertinent Labs & Imaging studies reviewed. (See chart for details)    1:45 AM - Patient seen and examined at bedside.         Patient noted to have slightly elevated blood pressure likely circumstantial secondary to presenting complaint. Referred to primary care physician for further evaluation.        Medical Decision Making: Labs as above.  Initial urine sample showed no moderate squamous epithelials trace esterase and 10-20 whites repeat cath specimen still shows a few squamous epithelials and  "white cells with esterase.  Symptoms skin and physical exam consistent with early pyelonephritis patient is a leukocytosis of 17 with a left shift.  There is no acute abnormality on the CAT scan there is an left-sided nephrolithiasis in the renal pelvis there is no ureterolithiasis no other year obstructive uropathy there is also a renal cyst on the left side.  Patient is made aware of the findings will follow up on renal cyst with primary care physician she is otherwise was treated with 2 g of Rocephin here discharged on Bactrim as below return in 12-24 hours for ongoing symptoms sooner if you have worsening pain fevers chills nausea vomiting any other acute symptoms or concerns otherwise discharged in stable and improved condition.    /60   Pulse 78   Temp 36.1 °C (97 °F) (Temporal)   Resp 16   Ht 1.676 m (5' 6\")   Wt 41.3 kg (91 lb)   LMP 01/07/2019   SpO2 98%   BMI 14.69 kg/m²     [address] [address]  [phone]  Schedule an appointment as soon as possible for a visit   for establishment of primary care, for blood pressure management    [facility], Emergency Dept  [address] [facility] [address] [phone]    in 12-24 hours if symptoms persist,, immediately if symptoms worsen      New Prescriptions    SULFAMETHOXAZOLE-TRIMETHOPRIM (BACTRIM DS) 800-160 MG TABLET    Take 2 Tabs by mouth 2 times a day for 10 days.       FINAL IMPRESSION  1. Pyelonephritis Active   2.  Abdominal pain  3.  Leukocytosis      This dictation has been created using voice recognition software and/or scribes. The accuracy of the dictation is limited by the abilities of the software and the expertise of the scribes. I expect there may be some errors of grammar and possibly content. I made every attempt to manually correct the errors within my dictation. However, errors related to voice recognition software and/or scribes may still exist and should be " interpreted within the appropriate context.

## 2019-09-20 ENCOUNTER — HOSPITAL ENCOUNTER (EMERGENCY)
Facility: MEDICAL CENTER | Age: 29
End: 2019-09-20
Attending: EMERGENCY MEDICINE

## 2019-09-20 VITALS
OXYGEN SATURATION: 98 % | TEMPERATURE: 96.8 F | DIASTOLIC BLOOD PRESSURE: 85 MMHG | WEIGHT: 90.61 LBS | RESPIRATION RATE: 16 BRPM | SYSTOLIC BLOOD PRESSURE: 111 MMHG | BODY MASS INDEX: 15.1 KG/M2 | HEIGHT: 65 IN | HEART RATE: 85 BPM

## 2019-09-20 DIAGNOSIS — R21 RASH: ICD-10-CM

## 2019-09-20 PROCEDURE — 99283 EMERGENCY DEPT VISIT LOW MDM: CPT

## 2019-09-20 PROCEDURE — 700111 HCHG RX REV CODE 636 W/ 250 OVERRIDE (IP): Performed by: EMERGENCY MEDICINE

## 2019-09-20 RX ORDER — PREDNISONE 20 MG/1
20 TABLET ORAL DAILY
Qty: 5 TAB | Refills: 0 | Status: SHIPPED | OUTPATIENT
Start: 2019-09-20 | End: 2019-09-25

## 2019-09-20 RX ORDER — DEXAMETHASONE SODIUM PHOSPHATE 10 MG/ML
10 INJECTION, SOLUTION INTRAMUSCULAR; INTRAVENOUS ONCE
Status: COMPLETED | OUTPATIENT
Start: 2019-09-20 | End: 2019-09-20

## 2019-09-20 RX ORDER — DEXAMETHASONE 4 MG/1
8 TABLET ORAL 2 TIMES DAILY
Qty: 10 TAB | Refills: 0 | Status: SHIPPED | OUTPATIENT
Start: 2019-09-20 | End: 2019-09-20

## 2019-09-20 RX ADMIN — DEXAMETHASONE SODIUM PHOSPHATE 10 MG: 10 INJECTION INTRAMUSCULAR; INTRAVENOUS at 10:56

## 2019-09-20 NOTE — ED PROVIDER NOTES
ED Provider Note    CHIEF COMPLAINT  Chief Complaint   Patient presents with   • Rash     started on saturday; pt reports all over her body.        HPI  Radha Cerrato is a 28 y.o. female who presents to the emergency department complaining of an itchy rash all over her body.  The patient says it started on either Saturday or Sunday she is not really sure she does not know what part of her body it started on but it is scattered diffusely now.  She says when she takes a hot shower it gets worse.  She has 2 potential precipitating products including a new shampoo which she discontinued 2 days ago and also a new vitamin that she also discontinued 2 days ago but the rash did not go away.  In addition she is had a very slight cough and runny nose and her boyfriend also had a cough and runny nose which has now completely resolved and he did not develop a rash.    REVIEW OF SYSTEMS no fever or chills no nausea or vomiting no shortness of breath she is able to tolerate oral intake.    PAST MEDICAL HISTORY  Past Medical History:   Diagnosis Date   • Ectopic pregnancy    • Renal disorder     Chronic UTI, Kidney infections       FAMILY HISTORY  History reviewed. No pertinent family history.    SOCIAL HISTORY  Social History     Socioeconomic History   • Marital status: Single     Spouse name: Not on file   • Number of children: Not on file   • Years of education: Not on file   • Highest education level: Not on file   Occupational History   • Not on file   Social Needs   • Financial resource strain: Not on file   • Food insecurity:     Worry: Not on file     Inability: Not on file   • Transportation needs:     Medical: Not on file     Non-medical: Not on file   Tobacco Use   • Smoking status: Current Every Day Smoker     Packs/day: 0.30     Types: Cigarettes   • Smokeless tobacco: Never Used   Substance and Sexual Activity   • Alcohol use: No     Comment: social   • Drug use: Yes     Comment: marijuana   • Sexual  "activity: Not on file   Lifestyle   • Physical activity:     Days per week: Not on file     Minutes per session: Not on file   • Stress: Not on file   Relationships   • Social connections:     Talks on phone: Not on file     Gets together: Not on file     Attends [employer] service: Not on file     Active member of club or organization: Not on file     Attends meetings of clubs or organizations: Not on file     Relationship status: Not on file   • Intimate partner violence:     Fear of current or ex partner: Not on file     Emotionally abused: Not on file     Physically abused: Not on file     Forced sexual activity: Not on file   Other Topics Concern   • Not on file   Social History Narrative   • Not on file       SURGICAL HISTORY  History reviewed. No pertinent surgical history.    CURRENT MEDICATIONS  Home Medications     Reviewed by Silva Linda R.N. (Registered Nurse) on 09/20/19 at 1036  Med List Status: Complete   Medication Last Dose Status        Patient Carlos Taking any Medications                       ALLERGIES  Allergies   Allergen Reactions   • Vicodin [Hydrocodone-Acetaminophen] Vomiting       PHYSICAL EXAM  VITAL SIGNS: /85   Pulse 85   Temp 36 °C (96.8 °F) (Temporal)   Resp 16   Ht 1.651 m (5' 5\")   Wt 41.1 kg (90 lb 9.7 oz)   LMP 09/01/2019   SpO2 98%   BMI 15.08 kg/m²    Oxygen saturation is interpreted as adequate  Constitutional: Awake and well-appearing individual in no distress  HENT: Mucous membranes are moist throat clear there is no erythema discharge or asymmetry  Eyes: No erythema or discharge  Neck: Slight anterior bilateral lymphadenopathy no meningeal findings  Cardiovascular: Regular rate and rhythm  Lungs: Clear and equal bilaterally with no apparent difficulty breathing  Skin: Warm and dry, the patient points out on her forearms a very minimal slightly erythematous rash almost imperceptible clearly not petechiae or purpura, no vesicles or pustules or skin " desquamination.  Musculoskeletal: No acute bony deformity  Neurologic: Awake verbal ambulatory    MEDICAL DECISION MAKING and DISPOSITION  I have advised the patient that this is a very nonspecific rash potentially this could be related to a viral infection or alternatively to her new vitamins or shampoo.  I have instructed her to not use those products for now and I have given her dose of Decadron in the emergency department and a 5-day course of low-dose prednisone prescription and have also recommended over-the-counter Benadryl and Pepcid if needed for rash or itchiness.  If she feels she is having new or worsening symptoms she is to return here otherwise if she is not clearly better in 1 week she is to follow-up with the primary care clinic for recheck    IMPRESSION  1.  Rash      Electronically signed by: Jeff Messer, 9/20/2019 10:52 AM

## 2019-09-20 NOTE — DISCHARGE INSTRUCTIONS
You may also use over-the-counter Benadryl and Pepcid for rash and itchiness.  Discontinue the use of vitamins for now.  If you feel you are developing new or worsening symptoms return here for recheck.  If the rash has not completely resolved in 1 week follow-up with your primary care doctor

## 2019-09-20 NOTE — ED TRIAGE NOTES
Pt ambulated to triage with   Chief Complaint   Patient presents with   • Rash     started on saturday; pt reports all over her body.       Pt Informed regarding triage process and verbalized understanding to inform triage tech or RN for any changes in condition. Placed in lobby.